# Patient Record
Sex: FEMALE | Race: BLACK OR AFRICAN AMERICAN | NOT HISPANIC OR LATINO
[De-identification: names, ages, dates, MRNs, and addresses within clinical notes are randomized per-mention and may not be internally consistent; named-entity substitution may affect disease eponyms.]

---
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
## 2021-03-17 ENCOUNTER — OUTPATIENT (OUTPATIENT)
Dept: OUTPATIENT SERVICES | Facility: HOSPITAL | Age: 21
LOS: 1 days | End: 2021-03-17
Payer: MEDICAID

## 2021-03-17 ENCOUNTER — LABORATORY RESULT (OUTPATIENT)
Age: 21
End: 2021-03-17

## 2021-03-17 ENCOUNTER — APPOINTMENT (OUTPATIENT)
Dept: OBGYN | Facility: CLINIC | Age: 21
End: 2021-03-17
Payer: MEDICAID

## 2021-03-17 VITALS — TEMPERATURE: 98.6 F

## 2021-03-17 VITALS — SYSTOLIC BLOOD PRESSURE: 118 MMHG | WEIGHT: 95 LBS | DIASTOLIC BLOOD PRESSURE: 72 MMHG

## 2021-03-17 DIAGNOSIS — Z01.419 ENCOUNTER FOR GYNECOLOGICAL EXAMINATION (GENERAL) (ROUTINE) W/OUT ABNORMAL FINDINGS: ICD-10-CM

## 2021-03-17 DIAGNOSIS — Z01.419 ENCOUNTER FOR GYNECOLOGICAL EXAMINATION (GENERAL) (ROUTINE) WITHOUT ABNORMAL FINDINGS: ICD-10-CM

## 2021-03-17 DIAGNOSIS — N76.0 ACUTE VAGINITIS: ICD-10-CM

## 2021-03-17 PROCEDURE — 87591 N.GONORRHOEAE DNA AMP PROB: CPT

## 2021-03-17 PROCEDURE — 87491 CHLMYD TRACH DNA AMP PROBE: CPT

## 2021-03-17 PROCEDURE — G0463: CPT

## 2021-03-17 PROCEDURE — 99385 PREV VISIT NEW AGE 18-39: CPT

## 2021-03-17 NOTE — HISTORY OF PRESENT ILLNESS
[FreeTextEntry1] : 21yo G0 (LMP 3/8/21) presents to establish care/ first gyn visit.  Pt recently sex active for first time +used condom.  Very uncomfortable experience ( consensual) - although no longer seeing partner.  Does not desires birth control.  Declines serum STD screen. \par \par Gynhx:   13/q28/4d.  + Gardasil completed.  \par Pmhx: denies\par Shx: denies\par Meds: none\par All: NKDA\par Sochx: denies/tob/vap/etou/drugs.  Denies dv or abuse.  Lives with family\par Famhx: denies  all . \par \par

## 2021-03-17 NOTE — PHYSICAL EXAM
[Appropriately responsive] : appropriately responsive [Alert] : alert [No Acute Distress] : no acute distress [No Lymphadenopathy] : no lymphadenopathy [Regular Rate Rhythm] : regular rate rhythm [No Murmurs] : no murmurs [Clear to Auscultation B/L] : clear to auscultation bilaterally [Soft] : soft [Non-tender] : non-tender [Non-distended] : non-distended [No HSM] : No HSM [No Lesions] : no lesions [No Mass] : no mass [Oriented x3] : oriented x3 [Examination Of The Breasts] : a normal appearance [No Masses] : no breast masses were palpable [Labia Majora] : normal [Labia Minora] : normal [Normal] : normal [Tenderness] : nontender [Mass ___ cm] : no uterine mass was palpated [Uterine Adnexae] : normal [FreeTextEntry4] : tight introitus- pt barely able to tolerate pediatric spec.

## 2021-03-17 NOTE — DISCUSSION/SUMMARY
[FreeTextEntry1] : 21 yo G0-  new pt/ annual gyn exam\par - [] gc/ct collected\par - safe sex practices discussed at length.\par - does not want any contraception\par - gardasil completed per pt\par \par Gen health - pt states she has PCP\par \par RTC 1 yr/prn.  PAP @21\par Sabrina hussein, PAc

## 2021-03-18 LAB
C TRACH RRNA SPEC QL NAA+PROBE: SIGNIFICANT CHANGE UP
N GONORRHOEA RRNA SPEC QL NAA+PROBE: SIGNIFICANT CHANGE UP
SPECIMEN SOURCE: SIGNIFICANT CHANGE UP

## 2022-02-01 ENCOUNTER — LABORATORY RESULT (OUTPATIENT)
Age: 22
End: 2022-02-01

## 2022-02-01 ENCOUNTER — APPOINTMENT (OUTPATIENT)
Dept: OBGYN | Facility: CLINIC | Age: 22
End: 2022-02-01
Payer: MEDICAID

## 2022-02-01 ENCOUNTER — OUTPATIENT (OUTPATIENT)
Dept: OUTPATIENT SERVICES | Facility: HOSPITAL | Age: 22
LOS: 1 days | End: 2022-02-01
Payer: MEDICAID

## 2022-02-01 VITALS
HEIGHT: 62 IN | BODY MASS INDEX: 18.22 KG/M2 | WEIGHT: 99 LBS | SYSTOLIC BLOOD PRESSURE: 104 MMHG | DIASTOLIC BLOOD PRESSURE: 70 MMHG

## 2022-02-01 DIAGNOSIS — Z00.00 ENCOUNTER FOR GENERAL ADULT MEDICAL EXAMINATION W/OUT ABNORMAL FINDINGS: ICD-10-CM

## 2022-02-01 DIAGNOSIS — N76.0 ACUTE VAGINITIS: ICD-10-CM

## 2022-02-01 PROCEDURE — 99212 OFFICE O/P EST SF 10 MIN: CPT | Mod: GC,25

## 2022-02-01 PROCEDURE — 88141 CYTOPATH C/V INTERPRET: CPT

## 2022-02-02 NOTE — HISTORY OF PRESENT ILLNESS
[FreeTextEntry1] : 19yo G0 LMP 3 weeks ago (patient unsure of exact date) presents for routine annual GYN visit. Pt is doing well, and has no complaints. She denies fevers, chills, chest pain, SOB, n/v/d, abd pain, vaginal discharge, constipation, or urinary complaints. She is not currently sexually active, but has used condoms for contraception when she was sexually active. Patient does not desire alternative method of contraception at this time. She is interested in STI testing. \par \par Gynhx: regular monthly menses q28 days. Denies fibroids, ovarian cysts, endometriosis, or STIs. has completed Gardasil vaccination series.\par Pmhx: denies\par Shx: denies\par Meds: none\par All: NKDA\par Sochx: denies/tob/vap/etoh/drugs. Lives with family, feels safe at home.\par Famhx: denies h/o ovarian, uterine, breast, colorectal cancer

## 2022-02-08 ENCOUNTER — LABORATORY RESULT (OUTPATIENT)
Age: 22
End: 2022-02-08

## 2022-02-08 LAB — CYTOLOGY SPEC DOC CYTO: SIGNIFICANT CHANGE UP

## 2022-02-08 PROCEDURE — 87591 N.GONORRHOEAE DNA AMP PROB: CPT

## 2022-02-08 PROCEDURE — 87624 HPV HI-RISK TYP POOLED RSLT: CPT

## 2022-02-08 PROCEDURE — G0463: CPT

## 2022-02-08 PROCEDURE — 87491 CHLMYD TRACH DNA AMP PROBE: CPT

## 2022-02-08 PROCEDURE — 88175 CYTOPATH C/V AUTO FLUID REDO: CPT

## 2022-02-14 DIAGNOSIS — Z00.00 ENCOUNTER FOR GENERAL ADULT MEDICAL EXAMINATION WITHOUT ABNORMAL FINDINGS: ICD-10-CM

## 2022-10-14 ENCOUNTER — EMERGENCY (EMERGENCY)
Facility: HOSPITAL | Age: 22
LOS: 1 days | Discharge: HOME | End: 2022-10-14
Attending: EMERGENCY MEDICINE
Payer: MEDICAID

## 2022-10-14 VITALS
RESPIRATION RATE: 16 BRPM | OXYGEN SATURATION: 100 % | HEART RATE: 97 BPM | TEMPERATURE: 99 F | DIASTOLIC BLOOD PRESSURE: 71 MMHG | SYSTOLIC BLOOD PRESSURE: 108 MMHG

## 2022-10-14 VITALS
RESPIRATION RATE: 20 BRPM | OXYGEN SATURATION: 100 % | HEIGHT: 61 IN | SYSTOLIC BLOOD PRESSURE: 135 MMHG | DIASTOLIC BLOOD PRESSURE: 82 MMHG | HEART RATE: 130 BPM | TEMPERATURE: 101 F | WEIGHT: 97 LBS

## 2022-10-14 LAB
ALBUMIN SERPL ELPH-MCNC: 4.5 G/DL — SIGNIFICANT CHANGE UP (ref 3.3–5)
ALP SERPL-CCNC: 57 U/L — SIGNIFICANT CHANGE UP (ref 40–120)
ALT FLD-CCNC: 11 U/L — SIGNIFICANT CHANGE UP (ref 10–45)
ANION GAP SERPL CALC-SCNC: 14 MMOL/L — SIGNIFICANT CHANGE UP (ref 5–17)
APPEARANCE UR: CLEAR — SIGNIFICANT CHANGE UP
AST SERPL-CCNC: 16 U/L — SIGNIFICANT CHANGE UP (ref 10–40)
BACTERIA # UR AUTO: NEGATIVE — SIGNIFICANT CHANGE UP
BASOPHILS # BLD AUTO: 0.04 K/UL — SIGNIFICANT CHANGE UP (ref 0–0.2)
BASOPHILS NFR BLD AUTO: 0.2 % — SIGNIFICANT CHANGE UP (ref 0–2)
BILIRUB SERPL-MCNC: 0.6 MG/DL — SIGNIFICANT CHANGE UP (ref 0.2–1.2)
BILIRUB UR-MCNC: NEGATIVE — SIGNIFICANT CHANGE UP
BUN SERPL-MCNC: 10 MG/DL — SIGNIFICANT CHANGE UP (ref 7–23)
CALCIUM SERPL-MCNC: 9.6 MG/DL — SIGNIFICANT CHANGE UP (ref 8.4–10.5)
CHLORIDE SERPL-SCNC: 99 MMOL/L — SIGNIFICANT CHANGE UP (ref 96–108)
CO2 SERPL-SCNC: 24 MMOL/L — SIGNIFICANT CHANGE UP (ref 22–31)
COLOR SPEC: COLORLESS — SIGNIFICANT CHANGE UP
CREAT SERPL-MCNC: 0.74 MG/DL — SIGNIFICANT CHANGE UP (ref 0.5–1.3)
DIFF PNL FLD: ABNORMAL
EGFR: 118 ML/MIN/1.73M2 — SIGNIFICANT CHANGE UP
EOSINOPHIL # BLD AUTO: 0.01 K/UL — SIGNIFICANT CHANGE UP (ref 0–0.5)
EOSINOPHIL NFR BLD AUTO: 0.1 % — SIGNIFICANT CHANGE UP (ref 0–6)
EPI CELLS # UR: 4 /HPF — SIGNIFICANT CHANGE UP
GLUCOSE SERPL-MCNC: 102 MG/DL — HIGH (ref 70–99)
GLUCOSE UR QL: NEGATIVE — SIGNIFICANT CHANGE UP
HCT VFR BLD CALC: 35.4 % — SIGNIFICANT CHANGE UP (ref 34.5–45)
HCV AB S/CO SERPL IA: 0.25 S/CO — SIGNIFICANT CHANGE UP (ref 0–0.99)
HCV AB SERPL-IMP: SIGNIFICANT CHANGE UP
HGB BLD-MCNC: 11.5 G/DL — SIGNIFICANT CHANGE UP (ref 11.5–15.5)
HIV 1 & 2 AB SERPL IA.RAPID: SIGNIFICANT CHANGE UP
HYALINE CASTS # UR AUTO: 0 /LPF — SIGNIFICANT CHANGE UP (ref 0–2)
IMM GRANULOCYTES NFR BLD AUTO: 0.8 % — SIGNIFICANT CHANGE UP (ref 0–0.9)
KETONES UR-MCNC: SIGNIFICANT CHANGE UP
LEUKOCYTE ESTERASE UR-ACNC: ABNORMAL
LYMPHOCYTES # BLD AUTO: 1.29 K/UL — SIGNIFICANT CHANGE UP (ref 1–3.3)
LYMPHOCYTES # BLD AUTO: 6.5 % — LOW (ref 13–44)
MCHC RBC-ENTMCNC: 29.7 PG — SIGNIFICANT CHANGE UP (ref 27–34)
MCHC RBC-ENTMCNC: 32.5 GM/DL — SIGNIFICANT CHANGE UP (ref 32–36)
MCV RBC AUTO: 91.5 FL — SIGNIFICANT CHANGE UP (ref 80–100)
MONOCYTES # BLD AUTO: 1.44 K/UL — HIGH (ref 0–0.9)
MONOCYTES NFR BLD AUTO: 7.2 % — SIGNIFICANT CHANGE UP (ref 2–14)
NEUTROPHILS # BLD AUTO: 17.05 K/UL — HIGH (ref 1.8–7.4)
NEUTROPHILS NFR BLD AUTO: 85.2 % — HIGH (ref 43–77)
NITRITE UR-MCNC: NEGATIVE — SIGNIFICANT CHANGE UP
NRBC # BLD: 0 /100 WBCS — SIGNIFICANT CHANGE UP (ref 0–0)
PH UR: 6 — SIGNIFICANT CHANGE UP (ref 5–8)
PLATELET # BLD AUTO: 341 K/UL — SIGNIFICANT CHANGE UP (ref 150–400)
POTASSIUM SERPL-MCNC: 4.2 MMOL/L — SIGNIFICANT CHANGE UP (ref 3.5–5.3)
POTASSIUM SERPL-SCNC: 4.2 MMOL/L — SIGNIFICANT CHANGE UP (ref 3.5–5.3)
PROT SERPL-MCNC: 8.9 G/DL — HIGH (ref 6–8.3)
PROT UR-MCNC: NEGATIVE — SIGNIFICANT CHANGE UP
RAPID RVP RESULT: SIGNIFICANT CHANGE UP
RBC # BLD: 3.87 M/UL — SIGNIFICANT CHANGE UP (ref 3.8–5.2)
RBC # FLD: 13 % — SIGNIFICANT CHANGE UP (ref 10.3–14.5)
RBC CASTS # UR COMP ASSIST: 2 /HPF — SIGNIFICANT CHANGE UP (ref 0–4)
S PYO AG SPEC QL IA: NEGATIVE — SIGNIFICANT CHANGE UP
SARS-COV-2 RNA SPEC QL NAA+PROBE: SIGNIFICANT CHANGE UP
SODIUM SERPL-SCNC: 137 MMOL/L — SIGNIFICANT CHANGE UP (ref 135–145)
SP GR SPEC: 1.01 — LOW (ref 1.01–1.02)
T PALLIDUM AB TITR SER: NEGATIVE — SIGNIFICANT CHANGE UP
UROBILINOGEN FLD QL: NEGATIVE — SIGNIFICANT CHANGE UP
WBC # BLD: 19.99 K/UL — HIGH (ref 3.8–10.5)
WBC # FLD AUTO: 19.99 K/UL — HIGH (ref 3.8–10.5)
WBC UR QL: 7 /HPF — HIGH (ref 0–5)

## 2022-10-14 PROCEDURE — 80053 COMPREHEN METABOLIC PANEL: CPT

## 2022-10-14 PROCEDURE — 86803 HEPATITIS C AB TEST: CPT

## 2022-10-14 PROCEDURE — 86703 HIV-1/HIV-2 1 RESULT ANTBDY: CPT

## 2022-10-14 PROCEDURE — 99284 EMERGENCY DEPT VISIT MOD MDM: CPT

## 2022-10-14 PROCEDURE — 81001 URINALYSIS AUTO W/SCOPE: CPT

## 2022-10-14 PROCEDURE — 85025 COMPLETE CBC W/AUTO DIFF WBC: CPT

## 2022-10-14 PROCEDURE — 87040 BLOOD CULTURE FOR BACTERIA: CPT

## 2022-10-14 PROCEDURE — 87880 STREP A ASSAY W/OPTIC: CPT

## 2022-10-14 PROCEDURE — 87086 URINE CULTURE/COLONY COUNT: CPT

## 2022-10-14 PROCEDURE — 0225U NFCT DS DNA&RNA 21 SARSCOV2: CPT

## 2022-10-14 PROCEDURE — 93010 ELECTROCARDIOGRAM REPORT: CPT

## 2022-10-14 PROCEDURE — 87081 CULTURE SCREEN ONLY: CPT

## 2022-10-14 PROCEDURE — 93005 ELECTROCARDIOGRAM TRACING: CPT

## 2022-10-14 PROCEDURE — 86780 TREPONEMA PALLIDUM: CPT

## 2022-10-14 PROCEDURE — 84702 CHORIONIC GONADOTROPIN TEST: CPT

## 2022-10-14 RX ORDER — ACETAMINOPHEN 500 MG
650 TABLET ORAL ONCE
Refills: 0 | Status: COMPLETED | OUTPATIENT
Start: 2022-10-14 | End: 2022-10-14

## 2022-10-14 RX ORDER — SODIUM CHLORIDE 9 MG/ML
1000 INJECTION INTRAMUSCULAR; INTRAVENOUS; SUBCUTANEOUS ONCE
Refills: 0 | Status: COMPLETED | OUTPATIENT
Start: 2022-10-14 | End: 2022-10-14

## 2022-10-14 RX ADMIN — SODIUM CHLORIDE 1000 MILLILITER(S): 9 INJECTION INTRAMUSCULAR; INTRAVENOUS; SUBCUTANEOUS at 06:19

## 2022-10-14 RX ADMIN — Medication 650 MILLIGRAM(S): at 06:19

## 2022-10-14 NOTE — ED PROVIDER NOTE - NSFOLLOWUPINSTRUCTIONS_ED_ALL_ED_FT
Follow up with your primary care doctor in the next 2-3 days.   If rash persists in the next 7 days, you should follow up with a dermatologist; find contact information below to make an appointment.    Fever    A fever is an increase in the body's temperature above 100.4°F (38°C) or higher. In adults and children older than three months, a brief mild or moderate fever generally has no long-term effect, and it usually does not require treatment. Many times, fevers are the result of viral infections, which are self-resolving.  However, certain symptoms or diagnostic tests may suggest a bacterial infection that may respond to antibiotics. Take medications as directed by your health care provider.    SEEK IMMEDIATE MEDICAL CARE IF YOU OR YOUR CHILD HAVE ANY OF THE FOLLOWING SYMPTOMS : shortness of breath, seizure, rash/stiff neck/headache, severe abdominal pain, persistent vomiting, any signs of dehydration, or if your child has a fever for over five (5) days.

## 2022-10-14 NOTE — ED ADULT NURSE NOTE - OBJECTIVE STATEMENT
22 YO female with no PMH    via walk in presenting with complaints of  fever and rash . pt reports 1 day of fever, light headedness, n/v and b/l leg rash. Denies CP, SOB, stiff neck. Pt Axox4, gross neuro intact,  respirations even, & non-labored Abdomen soft, non-tender, non-distended. Skin warm, dry, and intact.  pt has  somewhat symmetrical red nodules b/l staring on inner thigh going down to ankles. Pt placed in position of comfort. Pt educated on call bell system and provided call bell. Bed in lowest position, wheels locked, appropriate side rails raised. Pt denies needs at this time. pt on cardiac monitor.

## 2022-10-14 NOTE — ED ADULT NURSE REASSESSMENT NOTE - NS ED NURSE REASSESS COMMENT FT1
Patient d/c. Reviewed d/c paperwork with patients, all questions answered at this time. Patient verbalizes understanding. IV removed. Patient instructed to return to the ER for any worsening s/s including chest pain, SOB, fever, n/v/d. Patient alert and stable at time of d/c. Patient will follow up with dermatology.
1049 pt pending dispo/vss states feeling better and wants to go home/

## 2022-10-14 NOTE — ED PROVIDER NOTE - PATIENT PORTAL LINK FT
You can access the FollowMyHealth Patient Portal offered by [facility] by registering at the following website: [email or web address]. By joining [employer]’s FollowMyHealth portal, you will also be able to view your health information using other applications (apps) compatible with our system.

## 2022-10-14 NOTE — ED ADULT NURSE NOTE - ISOLATION TYPE:
[address]  [facility] EMERGENCY DEPT  [address] [id number] [phone]    Work/School Note    Date: 1/22/2022    To Whom It May concern:      Ledy Leon was seen and treated today in the emergency room by the following provider(s):  Attending Provider: Ronnell Fuentes MD.      Ledy Leon is excused from work/school on 01/22/22. She is clear to return to work/school on 01/23/22. Sincerely,          Holley Falk MD [employer] precautions

## 2022-10-14 NOTE — ED PROVIDER NOTE - PHYSICAL EXAMINATION
slight tachycardia, clear lungs  1cm nodule, red, symmetric, 4 total, 2 on each leg, one near ankle medially and one inner thigh. tender.

## 2022-10-14 NOTE — ED PROVIDER NOTE - OBJECTIVE STATEMENT
Dr. Carolina Note: 21F with fever, b/l LE red spots that are raised and painful, body aches, nausea, lightheadedness, onset yesterday, constant, no associated neck pain, light sensitivity, headache, cp, sob. 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  No obvious sick contacts otherwise, no sore throat, no priors like this, not sexually active, no prior STIs.

## 2022-10-14 NOTE — ED PROVIDER NOTE - NSFOLLOWUPCLINICS_GEN_ALL_ED_FT
[facility] [place]  [facility]  [address], [address]  [place] [address]  Phone: [phone]  Fax: [phone]

## 2022-10-14 NOTE — ED PROVIDER NOTE - CLINICAL SUMMARY MEDICAL DECISION MAKING FREE TEXT BOX
Dr. Carolina Note: fever, erythema nodusum, viral type symptoms, nontoxic appearing, no signs of meningitis, consider atypical bacterial vs viral vs inflammatory response, labs, fluids, viral panel, strep, cultures, outpt if cbc normal and sxs improved

## 2022-10-15 LAB
CULTURE RESULTS: SIGNIFICANT CHANGE UP
SPECIMEN SOURCE: SIGNIFICANT CHANGE UP

## 2022-10-18 ENCOUNTER — EMERGENCY (EMERGENCY)
Facility: HOSPITAL | Age: 22
LOS: 1 days | Discharge: HOME | End: 2022-10-18
Attending: EMERGENCY MEDICINE
Payer: MEDICAID

## 2022-10-18 VITALS
SYSTOLIC BLOOD PRESSURE: 118 MMHG | TEMPERATURE: 98 F | RESPIRATION RATE: 17 BRPM | HEART RATE: 69 BPM | OXYGEN SATURATION: 97 % | DIASTOLIC BLOOD PRESSURE: 78 MMHG

## 2022-10-18 VITALS
SYSTOLIC BLOOD PRESSURE: 108 MMHG | DIASTOLIC BLOOD PRESSURE: 70 MMHG | HEART RATE: 123 BPM | WEIGHT: 93.92 LBS | RESPIRATION RATE: 16 BRPM | TEMPERATURE: 98 F | HEIGHT: 61 IN | OXYGEN SATURATION: 100 %

## 2022-10-18 LAB
ALBUMIN SERPL ELPH-MCNC: 4.1 G/DL — SIGNIFICANT CHANGE UP (ref 3.3–5)
ALP SERPL-CCNC: 54 U/L — SIGNIFICANT CHANGE UP (ref 40–120)
ALT FLD-CCNC: 22 U/L — SIGNIFICANT CHANGE UP (ref 10–45)
ANION GAP SERPL CALC-SCNC: 11 MMOL/L — SIGNIFICANT CHANGE UP (ref 5–17)
AST SERPL-CCNC: 28 U/L — SIGNIFICANT CHANGE UP (ref 10–40)
BILIRUB SERPL-MCNC: 0.4 MG/DL — SIGNIFICANT CHANGE UP (ref 0.2–1.2)
BUN SERPL-MCNC: 8 MG/DL — SIGNIFICANT CHANGE UP (ref 7–23)
CALCIUM SERPL-MCNC: 9.7 MG/DL — SIGNIFICANT CHANGE UP (ref 8.4–10.5)
CHLORIDE SERPL-SCNC: 100 MMOL/L — SIGNIFICANT CHANGE UP (ref 96–108)
CO2 SERPL-SCNC: 27 MMOL/L — SIGNIFICANT CHANGE UP (ref 22–31)
CREAT SERPL-MCNC: 0.63 MG/DL — SIGNIFICANT CHANGE UP (ref 0.5–1.3)
CRP SERPL-MCNC: 46 MG/L — HIGH (ref 0–4)
EGFR: 129 ML/MIN/1.73M2 — SIGNIFICANT CHANGE UP
GLUCOSE SERPL-MCNC: 78 MG/DL — SIGNIFICANT CHANGE UP (ref 70–99)
HCG SERPL-ACNC: <2 MIU/ML — SIGNIFICANT CHANGE UP
HCT VFR BLD CALC: 32.7 % — LOW (ref 34.5–45)
HGB BLD-MCNC: 10.3 G/DL — LOW (ref 11.5–15.5)
MCHC RBC-ENTMCNC: 29.1 PG — SIGNIFICANT CHANGE UP (ref 27–34)
MCHC RBC-ENTMCNC: 31.5 GM/DL — LOW (ref 32–36)
MCV RBC AUTO: 92.4 FL — SIGNIFICANT CHANGE UP (ref 80–100)
NRBC # BLD: 0 /100 WBCS — SIGNIFICANT CHANGE UP (ref 0–0)
PLATELET # BLD AUTO: 335 K/UL — SIGNIFICANT CHANGE UP (ref 150–400)
POTASSIUM SERPL-MCNC: 4.6 MMOL/L — SIGNIFICANT CHANGE UP (ref 3.5–5.3)
POTASSIUM SERPL-SCNC: 4.6 MMOL/L — SIGNIFICANT CHANGE UP (ref 3.5–5.3)
PROT SERPL-MCNC: 8.9 G/DL — HIGH (ref 6–8.3)
RBC # BLD: 3.54 M/UL — LOW (ref 3.8–5.2)
RBC # FLD: 12.9 % — SIGNIFICANT CHANGE UP (ref 10.3–14.5)
S PYO AG SPEC QL IA: NEGATIVE — SIGNIFICANT CHANGE UP
SODIUM SERPL-SCNC: 138 MMOL/L — SIGNIFICANT CHANGE UP (ref 135–145)
WBC # BLD: 9.56 K/UL — SIGNIFICANT CHANGE UP (ref 3.8–10.5)
WBC # FLD AUTO: 9.56 K/UL — SIGNIFICANT CHANGE UP (ref 3.8–10.5)

## 2022-10-18 PROCEDURE — 85652 RBC SED RATE AUTOMATED: CPT

## 2022-10-18 PROCEDURE — 71046 X-RAY EXAM CHEST 2 VIEWS: CPT | Mod: 26

## 2022-10-18 PROCEDURE — 85027 COMPLETE CBC AUTOMATED: CPT

## 2022-10-18 PROCEDURE — 93971 EXTREMITY STUDY: CPT | Mod: 26,RT

## 2022-10-18 PROCEDURE — 99285 EMERGENCY DEPT VISIT HI MDM: CPT

## 2022-10-18 PROCEDURE — 84702 CHORIONIC GONADOTROPIN TEST: CPT

## 2022-10-18 PROCEDURE — 99284 EMERGENCY DEPT VISIT MOD MDM: CPT | Mod: 25

## 2022-10-18 PROCEDURE — 93971 EXTREMITY STUDY: CPT

## 2022-10-18 PROCEDURE — 87081 CULTURE SCREEN ONLY: CPT

## 2022-10-18 PROCEDURE — 87880 STREP A ASSAY W/OPTIC: CPT

## 2022-10-18 PROCEDURE — 0225U NFCT DS DNA&RNA 21 SARSCOV2: CPT

## 2022-10-18 PROCEDURE — 86140 C-REACTIVE PROTEIN: CPT

## 2022-10-18 PROCEDURE — 71046 X-RAY EXAM CHEST 2 VIEWS: CPT

## 2022-10-18 PROCEDURE — 80053 COMPREHEN METABOLIC PANEL: CPT

## 2022-10-18 NOTE — ED PROVIDER NOTE - NS ED ATTENDING STATEMENT MOD
This was a shared visit with the SIDNEY. I reviewed and verified the documentation and independently performed the documented:

## 2022-10-18 NOTE — ED PROVIDER NOTE - NSFOLLOWUPINSTRUCTIONS_ED_ALL_ED_FT
Thank you for visiting our Emergency Department, it has been a pleasure taking part in your healthcare. Please follow up with your primary doctor within x48 hours.    Your discharge diagnosis is: rash and leg pain    Return precautions to the Emergency Department include but are not limited to: unrelenting nausea, vomiting, fever, chills, chest pain, shortness of breath, dizziness, abdominal pain, worsening pain, syncope, blood in urine or stool, headache that doesn't resolve, numbness or tingling, loss of sensation, loss of motor function, or any other concerning symptoms.     Follow up with dermatologist in the next week - The hospital will call you for follow up Thank you for visiting our Emergency Department, it has been a pleasure taking part in your healthcare. Please follow up with your primary doctor within 24-72 hours.    Your discharge diagnosis is: rash and leg pain.  While you were here you had lab work, US of your right lower leg, and a chest xray.  These tests were nonactionable.  As discussed, your C reactive protein was 46 which is a nonspecific inflammatory marker.  You need to follow up with your PMD Dr. Shanks.      Also as discussed, you need to follow up with dermatology.  One of the things this could be is Erythema Nodosum.  Dermatology may be able to confirm this diagnosis.  They may also think this is something different.  Your test results did not reveal any concerning abnormalities.  It is not entirely clear what is causing your symptoms at this time however it does not appear to be anything immediately dangerous.     Return precautions to the Emergency Department include but are not limited to: unrelenting nausea, vomiting, fever, chills, chest pain, shortness of breath, dizziness, abdominal pain, worsening pain, syncope, blood in urine or stool, headache that doesn't resolve, numbness or tingling, loss of sensation, loss of motor function, or any other concerning symptoms.     Follow up with dermatologist in the next week - The hospital will call you for follow up

## 2022-10-18 NOTE — ED ADULT NURSE NOTE - OBJECTIVE STATEMENT
pt c/o "swelling and pain in my legs that has been going on for the past week. the swelling in worse in my ankles." pt AOx3 speaking in full complete sentences. pt denies any recent long travel, birth control use, or smoking history. pt has full ROM in all extremities at present. no swelling noted to lower extremities at present. pt denies any lightheadedness, dizziness, chest pain, SOB, abd. pain, n/v/d, numbness/tingling at present.

## 2022-10-18 NOTE — ED PROVIDER NOTE - CLINICAL SUMMARY MEDICAL DECISION MAKING FREE TEXT BOX
pt with one week of  right leg pain with  rash to medial aspect of bilat leg,   vascular studies- r/o dvt, labs, throat culture, rvp.

## 2022-10-18 NOTE — ED PROVIDER NOTE - PATIENT PORTAL LINK FT
You can access the FollowMyHealth Patient Portal offered by [facility] by registering at the following website: [email or web address]. By joining [employer]’s FollowMyHealth portal, you will also be able to view your health information using other applications (apps) compatible with our system.

## 2022-10-18 NOTE — ED ADULT TRIAGE NOTE - CHIEF COMPLAINT QUOTE
worsening swelling of b/l legs x1 week. seen at [facility] ed recently. Unable to follow up with derm. now with pain

## 2022-10-18 NOTE — ED PROVIDER NOTE - ATTENDING APP SHARED VISIT CONTRIBUTION OF CARE
Attending MD Cornelius: I personally have seen and examined this patient.  NP note reviewed and agree on plan of care and except where noted.  See below for details.     Seen in [place] [facility]R    21F    L ankle, L inner thigh, R inner ankle    TO BE COMPLETED

## 2022-10-18 NOTE — ED PROVIDER NOTE - OBJECTIVE STATEMENT
20 yo female in [id number] presents to the ER c/o one week of left lower extremity pain.  Pt awake alert oriented x3 states "I have had this pain in my leg that seemed to have started in my knee and moved to my ankle. I have pain to the inside of calf not the back". Pt denies recent trauma, however has area of ecchymosis to bilat medial lower extremities above ankle.  Pt with pedal pulses palpable. Pt non smoker, decent recent trauma and travel, doesn't take ocp.   Denies chest pains and shortness of breath. 22 yo female in [id number] presents to the ER c/o one week of right lower extremity pain.  Pt awake alert oriented x3 states "I have had this pain in my leg that seemed to have started in my knee and moved to my ankle. I have pain to the inside of calf not the back". Pt denies recent trauma, however has area of ecchymosis to bilat medial lower extremities above ankle.  Pt with pedal pulses palpable. Pt non smoker, decent recent trauma and travel, doesn't take ocp.   Denies chest pains and shortness of breath.

## 2022-10-18 NOTE — ED PROVIDER NOTE - NSPTACCESSSVCSAPPTDETAILS_ED_ALL_ED_FT
This patient needs to see dermatology in the next week for bilat lower extremity rash This patient needs to see dermatology, ideally this week.

## 2022-10-18 NOTE — ED ADULT NURSE NOTE - CHIEF COMPLAINT QUOTE
worsening swelling of b/l legs x1 week. seen at [facility] ed recently. Unable to follow up with derm. now with pain

## 2022-10-18 NOTE — ED PROVIDER NOTE - RAPID ASSESSMENT
21 F presents with sudden onset lower extremity pain, and swelling. Reports lower extremity pain starting x6 days ago a/w swelling that initially started around her knee that began to radiate down her leg. Reports pain improved over the weekend. However, this evening, pt began to endorse b/l calf and ankle pain/swelling (R>L) tonight which prompted ED visit. Also noted rash to b/l lower extremity.  Pt is well appearing.     Scribe Statement: Daniel SOSA Tiffany, attest that this documentation has been prepared under the direction and in the presence of John Paul Dyer) 21 F presents with sudden onset lower extremity pain, and swelling. Reports lower extremity pain starting x6 days ago a/w swelling that initially started around her knee that began to radiate down her leg. Reports pain improved over the weekend. However, this evening, pt began to endorse b/l calf and ankle pain/swelling (R>L) tonight which prompted ED visit. Also noted rash to b/l lower extremity which was noted at pts ED visit the other day, given derm followup.  Pt is well appearing.     Scribe Statement: IDaniel Tiffany, attest that this documentation has been prepared under the direction and in the presence of John Paul Dyer (PA)    John Paul SOSA PA-C saw patient as a rapid assessment initially via telemedicine encounter. The rest of care to be performed by the primary ED team. Rapid assessment documented by romina in my presence. I have personally reviewed and approved the note written by the scribe. Receiving team will follow up on labs, analgesia, any clinical imaging, and perform reassessment and disposition of the patient as clinically indicated. All decisions regarding the progression of care will be made at their discretion.

## 2022-10-19 PROBLEM — Z78.9 OTHER SPECIFIED HEALTH STATUS: Chronic | Status: ACTIVE | Noted: 2022-10-14

## 2022-10-19 LAB
CULTURE RESULTS: SIGNIFICANT CHANGE UP
CULTURE RESULTS: SIGNIFICANT CHANGE UP
ERYTHROCYTE [SEDIMENTATION RATE] IN BLOOD: 101 MM/HR — HIGH (ref 0–15)
RAPID RVP RESULT: SIGNIFICANT CHANGE UP
SARS-COV-2 RNA SPEC QL NAA+PROBE: SIGNIFICANT CHANGE UP
SPECIMEN SOURCE: SIGNIFICANT CHANGE UP
SPECIMEN SOURCE: SIGNIFICANT CHANGE UP

## 2022-12-22 ENCOUNTER — APPOINTMENT (OUTPATIENT)
Dept: OBGYN | Facility: CLINIC | Age: 22
End: 2022-12-22

## 2023-02-02 ENCOUNTER — LABORATORY RESULT (OUTPATIENT)
Age: 23
End: 2023-02-02

## 2023-02-02 ENCOUNTER — OUTPATIENT (OUTPATIENT)
Dept: OUTPATIENT SERVICES | Facility: HOSPITAL | Age: 23
LOS: 1 days | End: 2023-02-02
Payer: MEDICAID

## 2023-02-02 ENCOUNTER — APPOINTMENT (OUTPATIENT)
Dept: OBGYN | Facility: CLINIC | Age: 23
End: 2023-02-02
Payer: MEDICAID

## 2023-02-02 VITALS — SYSTOLIC BLOOD PRESSURE: 102 MMHG | WEIGHT: 100.25 LBS | DIASTOLIC BLOOD PRESSURE: 62 MMHG

## 2023-02-02 DIAGNOSIS — N76.0 ACUTE VAGINITIS: ICD-10-CM

## 2023-02-02 DIAGNOSIS — Z11.3 ENCOUNTER FOR SCREENING FOR INFECTIONS WITH A PREDOMINANTLY SEXUAL MODE OF TRANSMISSION: ICD-10-CM

## 2023-02-02 DIAGNOSIS — Z01.419 ENCOUNTER FOR GYNECOLOGICAL EXAMINATION (GENERAL) (ROUTINE) W/OUT ABNORMAL FINDINGS: ICD-10-CM

## 2023-02-02 LAB — HIV 1+2 AB+HIV1 P24 AG SERPL QL IA: SIGNIFICANT CHANGE UP

## 2023-02-02 PROCEDURE — G0463: CPT

## 2023-02-02 PROCEDURE — 81003 URINALYSIS AUTO W/O SCOPE: CPT

## 2023-02-02 PROCEDURE — 99395 PREV VISIT EST AGE 18-39: CPT

## 2023-02-02 PROCEDURE — 87591 N.GONORRHOEAE DNA AMP PROB: CPT

## 2023-02-02 PROCEDURE — 36415 COLL VENOUS BLD VENIPUNCTURE: CPT

## 2023-02-02 PROCEDURE — 86780 TREPONEMA PALLIDUM: CPT

## 2023-02-02 PROCEDURE — 87389 HIV-1 AG W/HIV-1&-2 AB AG IA: CPT

## 2023-02-02 PROCEDURE — 87491 CHLMYD TRACH DNA AMP PROBE: CPT

## 2023-02-02 NOTE — HISTORY OF PRESENT ILLNESS
[Definite:  ___ (Date)] : the last menstrual period was [unfilled] [Normal Amount/Duration] : was of a normal amount and duration [Spotting Between  Menses] : no spotting between menses [Regular Cycle Intervals] : periods have been regular [Sexually Active] : is not sexually active

## 2023-02-02 NOTE — PROCEDURE
[Cervical Pap Smear] : cervical Pap smear [Liquid Base] : liquid base [GC Chlamydia Culture] : GC Chlamydia Culture [Tolerated Poorly] : the patient tolerated the procedure poorly [de-identified] : Used small white speculum.  Very tense throughout the exam

## 2023-02-03 LAB
C TRACH RRNA SPEC QL NAA+PROBE: SIGNIFICANT CHANGE UP
N GONORRHOEA RRNA SPEC QL NAA+PROBE: SIGNIFICANT CHANGE UP
SPECIMEN SOURCE: SIGNIFICANT CHANGE UP
T PALLIDUM AB TITR SER: NEGATIVE — SIGNIFICANT CHANGE UP

## 2023-02-07 LAB — CYTOLOGY SPEC DOC CYTO: SIGNIFICANT CHANGE UP

## 2023-02-08 DIAGNOSIS — Z01.419 ENCOUNTER FOR GYNECOLOGICAL EXAMINATION (GENERAL) (ROUTINE) WITHOUT ABNORMAL FINDINGS: ICD-10-CM

## 2023-02-08 DIAGNOSIS — Z11.3 ENCOUNTER FOR SCREENING FOR INFECTIONS WITH A PREDOMINANTLY SEXUAL MODE OF TRANSMISSION: ICD-10-CM

## 2023-08-18 ENCOUNTER — EMERGENCY (EMERGENCY)
Facility: HOSPITAL | Age: 23
LOS: 1 days | Discharge: HOME | End: 2023-08-18
Attending: EMERGENCY MEDICINE
Payer: COMMERCIAL

## 2023-08-18 VITALS
SYSTOLIC BLOOD PRESSURE: 94 MMHG | TEMPERATURE: 98 F | RESPIRATION RATE: 20 BRPM | DIASTOLIC BLOOD PRESSURE: 62 MMHG | WEIGHT: 98.11 LBS | HEIGHT: 61 IN | HEART RATE: 90 BPM | OXYGEN SATURATION: 98 %

## 2023-08-18 LAB
APPEARANCE UR: CLEAR — SIGNIFICANT CHANGE UP
BACTERIA # UR AUTO: NEGATIVE — SIGNIFICANT CHANGE UP
BILIRUB UR-MCNC: ABNORMAL
COLOR SPEC: ABNORMAL
DIFF PNL FLD: ABNORMAL
EPI CELLS # UR: 2 /HPF — SIGNIFICANT CHANGE UP
GLUCOSE UR QL: NEGATIVE — SIGNIFICANT CHANGE UP
HCG UR QL: NEGATIVE — SIGNIFICANT CHANGE UP
HYALINE CASTS # UR AUTO: 2 /LPF — SIGNIFICANT CHANGE UP (ref 0–2)
KETONES UR-MCNC: NEGATIVE — SIGNIFICANT CHANGE UP
LEUKOCYTE ESTERASE UR-ACNC: ABNORMAL
NITRITE UR-MCNC: POSITIVE
PH UR: 6 — SIGNIFICANT CHANGE UP (ref 5–8)
PROT UR-MCNC: ABNORMAL
RBC CASTS # UR COMP ASSIST: 3 /HPF — SIGNIFICANT CHANGE UP (ref 0–4)
SP GR SPEC: 1.01 — SIGNIFICANT CHANGE UP (ref 1.01–1.02)
UROBILINOGEN FLD QL: ABNORMAL
WBC UR QL: 16 /HPF — HIGH (ref 0–5)

## 2023-08-18 PROCEDURE — 99284 EMERGENCY DEPT VISIT MOD MDM: CPT

## 2023-08-18 NOTE — ED ADULT NURSE NOTE - OBJECTIVE STATEMENT
Pt is 23 y/o female, presenting to the ED c/o frequent urination x1 week. Pt reports sexually active w/ boyfriend, LMP 7/24/23. Pt well appearing, AxOx3, ambulatory. Breathing spontaneously and unlabored, >95% RA. Pt denies any other medical complaints. Safety and comfort measures provided- bed in lowest position, locked, and blanket given.

## 2023-08-19 VITALS
HEART RATE: 88 BPM | SYSTOLIC BLOOD PRESSURE: 97 MMHG | TEMPERATURE: 98 F | DIASTOLIC BLOOD PRESSURE: 60 MMHG | OXYGEN SATURATION: 98 % | RESPIRATION RATE: 16 BRPM

## 2023-08-19 LAB
C TRACH RRNA SPEC QL NAA+PROBE: SIGNIFICANT CHANGE UP
HIV 1 & 2 AB SERPL IA.RAPID: SIGNIFICANT CHANGE UP
N GONORRHOEA RRNA SPEC QL NAA+PROBE: SIGNIFICANT CHANGE UP
SPECIMEN SOURCE: SIGNIFICANT CHANGE UP
T PALLIDUM AB TITR SER: NEGATIVE — SIGNIFICANT CHANGE UP

## 2023-08-19 PROCEDURE — 81001 URINALYSIS AUTO W/SCOPE: CPT

## 2023-08-19 PROCEDURE — 86780 TREPONEMA PALLIDUM: CPT

## 2023-08-19 PROCEDURE — 87591 N.GONORRHOEAE DNA AMP PROB: CPT

## 2023-08-19 PROCEDURE — 86703 HIV-1/HIV-2 1 RESULT ANTBDY: CPT

## 2023-08-19 PROCEDURE — 81025 URINE PREGNANCY TEST: CPT

## 2023-08-19 PROCEDURE — 87077 CULTURE AEROBIC IDENTIFY: CPT

## 2023-08-19 PROCEDURE — 87186 SC STD MICRODIL/AGAR DIL: CPT

## 2023-08-19 PROCEDURE — 99283 EMERGENCY DEPT VISIT LOW MDM: CPT

## 2023-08-19 PROCEDURE — 87491 CHLMYD TRACH DNA AMP PROBE: CPT

## 2023-08-19 PROCEDURE — 87086 URINE CULTURE/COLONY COUNT: CPT

## 2023-08-19 RX ORDER — NITROFURANTOIN MACROCRYSTAL 50 MG
100 CAPSULE ORAL ONCE
Refills: 0 | Status: COMPLETED | OUTPATIENT
Start: 2023-08-19 | End: 2023-08-19

## 2023-08-19 RX ORDER — NITROFURANTOIN MACROCRYSTAL 50 MG
1 CAPSULE ORAL
Qty: 14 | Refills: 0
Start: 2023-08-19 | End: 2023-08-25

## 2023-08-19 RX ADMIN — Medication 100 MILLIGRAM(S): at 01:52

## 2023-08-19 NOTE — ED PROVIDER NOTE - CPE EDP GU CHAPERONE
Patient reports "I slipped on the floor at a wedding and hurt my ankle. The pain goes all the way to my right knee." Right knee and right lateral ankle swollen. Distal pulses intact. OLI Green/Name of Chaperone

## 2023-08-19 NOTE — ED PROVIDER NOTE - PATIENT PORTAL LINK FT
You can access the FollowMyHealth Patient Portal offered by [facility] by registering at the following website: [email or web address]. By joining [employer]’s FollowMyHealth portal, you will also be able to view your health information using other applications (apps) compatible with our system.

## 2023-08-19 NOTE — ED PROVIDER NOTE - ATTENDING CONTRIBUTION TO CARE
------------ATTENDING NOTE------------   pt c/o increased urinary frequency and mild burning dysuria, no fevers or back/flank pain or systemic symptoms, UA c/w UTI, soft benign abd, tolerating PO, bimanual wnl, nml VS, ED sign out midnight pending HIV and planned dc w/ clsoe outpt fu.  - Marco Cervantes MD   -----------------------------------------------

## 2023-08-19 NOTE — ED PROVIDER NOTE - OBJECTIVE STATEMENT
21yo female pt, no PMHx presents to ED with frequent urination for one week. Sexually active with her boyfriend and last intercourse was 8/11/23. LMP-7/24/23. Denies dysuria or urgent urination. Denies abd pain or back pain. Denies vaginal rash, itching or bleeding. Denies sensory changes or weakness to extremities.

## 2023-08-19 NOTE — ED PROVIDER NOTE - NSFOLLOWUPINSTRUCTIONS_ED_ALL_ED_FT
See your Primary Doctor this week for follow up -- call to discuss.    Take MACROBID as directed for urinary tract infection -- see medication warnings.    See URINARY TRACT INFECTION information and return instructions given to you.    Seek immediate medical care for new/worsening symptoms/concerns.

## 2023-08-21 LAB
-  AMIKACIN: SIGNIFICANT CHANGE UP
-  AMOXICILLIN/CLAVULANIC ACID: SIGNIFICANT CHANGE UP
-  AMPICILLIN/SULBACTAM: SIGNIFICANT CHANGE UP
-  AMPICILLIN: SIGNIFICANT CHANGE UP
-  AZTREONAM: SIGNIFICANT CHANGE UP
-  CEFAZOLIN: SIGNIFICANT CHANGE UP
-  CEFEPIME: SIGNIFICANT CHANGE UP
-  CEFOXITIN: SIGNIFICANT CHANGE UP
-  CEFTRIAXONE: SIGNIFICANT CHANGE UP
-  CIPROFLOXACIN: SIGNIFICANT CHANGE UP
-  ERTAPENEM: SIGNIFICANT CHANGE UP
-  GENTAMICIN: SIGNIFICANT CHANGE UP
-  IMIPENEM: SIGNIFICANT CHANGE UP
-  LEVOFLOXACIN: SIGNIFICANT CHANGE UP
-  MEROPENEM: SIGNIFICANT CHANGE UP
-  NITROFURANTOIN: SIGNIFICANT CHANGE UP
-  PIPERACILLIN/TAZOBACTAM: SIGNIFICANT CHANGE UP
-  TOBRAMYCIN: SIGNIFICANT CHANGE UP
-  TRIMETHOPRIM/SULFAMETHOXAZOLE: SIGNIFICANT CHANGE UP
CULTURE RESULTS: SIGNIFICANT CHANGE UP
METHOD TYPE: SIGNIFICANT CHANGE UP
ORGANISM # SPEC MICROSCOPIC CNT: SIGNIFICANT CHANGE UP
ORGANISM # SPEC MICROSCOPIC CNT: SIGNIFICANT CHANGE UP
SPECIMEN SOURCE: SIGNIFICANT CHANGE UP

## 2023-08-21 NOTE — ED POST DISCHARGE NOTE - DETAILS
8/21: Was dc on Macrobid, f/u final sensitivities to see if appropriate care given. Philippe Everett PA-C 8/22: Macrobid appropriate based off sensitives.

## 2023-08-25 ENCOUNTER — APPOINTMENT (OUTPATIENT)
Dept: OBGYN | Facility: CLINIC | Age: 23
End: 2023-08-25

## 2024-11-01 ENCOUNTER — APPOINTMENT (OUTPATIENT)
Dept: OBGYN | Facility: CLINIC | Age: 24
End: 2024-11-01
Payer: COMMERCIAL

## 2024-11-01 VITALS
WEIGHT: 98 LBS | SYSTOLIC BLOOD PRESSURE: 117 MMHG | DIASTOLIC BLOOD PRESSURE: 75 MMHG | HEIGHT: 62 IN | BODY MASS INDEX: 18.03 KG/M2

## 2024-11-01 DIAGNOSIS — Z01.419 ENCOUNTER FOR GYNECOLOGICAL EXAMINATION (GENERAL) (ROUTINE) W/OUT ABNORMAL FINDINGS: ICD-10-CM

## 2024-11-01 DIAGNOSIS — Z11.3 ENCOUNTER FOR SCREENING FOR INFECTIONS WITH A PREDOMINANTLY SEXUAL MODE OF TRANSMISSION: ICD-10-CM

## 2024-11-01 PROCEDURE — 99395 PREV VISIT EST AGE 18-39: CPT

## 2024-11-01 PROCEDURE — G0444 DEPRESSION SCREEN ANNUAL: CPT | Mod: 59

## 2024-11-03 LAB
HBV SURFACE AG SER QL: NONREACTIVE
HCV AB SER QL: NONREACTIVE
HCV S/CO RATIO: 0.17 S/CO
HIV1+2 AB SPEC QL IA.RAPID: NONREACTIVE
T PALLIDUM AB SER QL IA: NEGATIVE

## 2024-11-04 LAB
C TRACH RRNA SPEC QL NAA+PROBE: NOT DETECTED
HPV HIGH+LOW RISK DNA PNL CVX: NOT DETECTED
N GONORRHOEA RRNA SPEC QL NAA+PROBE: NOT DETECTED
SOURCE TP AMPLIFICATION: NORMAL

## 2024-11-07 LAB — CYTOLOGY CVX/VAG DOC THIN PREP: NORMAL

## 2025-05-01 NOTE — ED ADULT TRIAGE NOTE - HEART RATE (BEATS/MIN)
90 Render Risk Assessment In Note?: no Additional Notes: Patient was consulted on Juvederm skinvive to help with hydrating her face. Also recommended aqua gold and quoted [phone] if using exosomes